# Patient Record
Sex: MALE | Race: WHITE | NOT HISPANIC OR LATINO | Employment: UNEMPLOYED
[De-identification: names, ages, dates, MRNs, and addresses within clinical notes are randomized per-mention and may not be internally consistent; named-entity substitution may affect disease eponyms.]

---

## 2017-06-01 ENCOUNTER — HOSPITAL (OUTPATIENT)
Dept: OTHER | Age: 37
End: 2017-06-01
Attending: EMERGENCY MEDICINE

## 2017-06-01 LAB
DEVICE SN: NORMAL
POC_GLU: 85 MG/DL (ref 70–99)
TECH_ID: NORMAL
VALPRO ACID LVL: 66 UG/ML (ref 50–100)

## 2017-11-22 ENCOUNTER — HOSPITAL (OUTPATIENT)
Dept: OTHER | Age: 37
End: 2017-11-22
Attending: EMERGENCY MEDICINE

## 2017-11-22 LAB
A/G RATIO_: 0.8
ABS LYMPH: 1.8 K/CUMM (ref 1–3.5)
ABS MONO: 0.9 K/CUMM (ref 0.1–0.8)
ABS NEUTRO: 2.6 K/CUMM (ref 2–8)
ALBUMIN: 3.2 G/DL (ref 3.5–5)
ALCOHOL, ETHYL: 24 MG/DL (ref 0–10)
ALK PHOS: 50 UNIT/L (ref 50–124)
ALT/GPT: 16 UNIT/L (ref 0–55)
ANION GAP SERPL CALC-SCNC: 12 MEQ/L (ref 10–20)
AST/GOT: 27 UNIT/L (ref 5–34)
BASOPHIL: 0 % (ref 0–1)
BILI TOTAL: 0.4 MG/DL (ref 0.2–1)
BUN SERPL-MCNC: 20 MG/DL (ref 6–20)
CALCIUM: 8.5 MG/DL (ref 8.4–10.2)
CHLORIDE: 105 MEQ/L (ref 97–107)
CREATININE: 0.95 MG/DL (ref 0.6–1.3)
DEVICE SN: NORMAL
DIFF_TYPE?: ABNORMAL
EOSINOPHIL: 4 % (ref 0–6)
GLOBULIN_: 3.9 G/DL (ref 2–4.1)
GLUCOSE LVL: 87 MG/DL (ref 70–99)
HCT VFR BLD CALC: 44 % (ref 36–51)
HEMOLYSIS 2+: ABNORMAL
HGB BLD-MCNC: 14.8 G/DL (ref 12–17)
IMMATURE GRAN: 0.4 % (ref 0–0.3)
INSTR WBC: 5.5 K/CUMM (ref 4–11)
LIPEMIC 3+: NEGATIVE
LYMPHOCYTE: 32 %
MCH RBC QN AUTO: 33 PG (ref 25–35)
MCHC RBC AUTO-ENTMCNC: 34 G/DL (ref 32–37)
MCV RBC AUTO: 98 FL (ref 78–97)
MONOCYTE: 17 %
NEUTROPHIL: 46 %
NRBC BLD MANUAL-RTO: 0 % (ref 0–0.2)
PLATELET: 203 K/CUMM (ref 150–450)
POC_GLU: 86 MG/DL (ref 70–99)
POTASSIUM: 4.3 MEQ/L (ref 3.5–5.1)
RBC # BLD: 4.46 M/CUMM (ref 4.2–6)
RDW: 14.6 % (ref 11.5–14.5)
SODIUM: 138 MEQ/L (ref 136–145)
TCO2: 25 MEQ/L (ref 19–29)
TECH_ID: NORMAL
TOTAL PROTEIN: 7.1 G/DL (ref 6.4–8.3)
VALPRO ACID LVL: 58.7 UG/ML (ref 50–100)
WBC # BLD: 5.5 K/CUMM (ref 4–11)

## 2017-11-24 ENCOUNTER — APPOINTMENT (OUTPATIENT)
Dept: CT IMAGING | Facility: HOSPITAL | Age: 37
DRG: 897 | End: 2017-11-24
Attending: EMERGENCY MEDICINE
Payer: COMMERCIAL

## 2017-11-24 PROBLEM — S00.83XA CONTUSION OF OTHER PART OF HEAD, INITIAL ENCOUNTER: Status: ACTIVE | Noted: 2017-11-24

## 2017-11-24 PROBLEM — F10.230 ALCOHOL WITHDRAWAL SYNDROME WITHOUT COMPLICATION (HCC): Status: ACTIVE | Noted: 2017-11-24

## 2017-11-24 PROCEDURE — 70450 CT HEAD/BRAIN W/O DYE: CPT | Performed by: EMERGENCY MEDICINE

## 2017-11-24 NOTE — ED PROVIDER NOTES
Patient Seen in: [facility] Emergency Department    History   Patient presents with:  Alcohol Intoxication (neurologic)    Stated Complaint:     HPI    The patient is a 70-year-old male who presents the emergency department requesting detox from a Eyes: Conjunctivae and EOM are normal. Pupils are equal, round, and reactive to light. Neck: Normal range of motion. Neck supple. No spinous process tenderness and no muscular tenderness present. Normal range of motion present.    Cardiovascular: Normal r CBC W/ DIFFERENTIAL[[id number]]          Abnormal            Final result                 Please view results for these tests on the individual orders.    DRUG ABUSE PANEL 10 SCREEN   RAINBOW DRAW BLUE   RAINBOW DRAW LAVENDER   RAINBOW DRAW DARK GREEN   RAIN

## 2017-11-25 NOTE — PLAN OF CARE
Problem: ANXIETY  Goal: Will report anxiety at manageable levels  INTERVENTIONS:  - Administer medication as ordered  - Teach and rehearse alternative coping skills  - Provide emotional support with 1:1 interaction with staff   Outcome: Progressing      Pr for headache. Problem: SAFETY ADULT - FALL  Goal: Free from fall injury  INTERVENTIONS:  - Assess pt frequently for physical needs  - Identify cognitive and physical deficits and behaviors that affect risk of falls.   - [place] fall precautions as ind

## 2017-11-25 NOTE — H&P
[facility]    PATIENT'S NAME: Nancy Dia   ATTENDING PHYSICIAN: Toni Diaz MD   PATIENT ACCOUNT#:   [id number]    LOCATION:  [address] [place] [address] RECORD #:   [id number]       YOB: 1980  ADMISSION DATE:       11/2 95.3 kg. HEENT:  The patient has ecchymosis to the left periorbital area, and this was secondary to a fall. NECK:  No JVD. No bruit. No lymphadenopathy. LUNGS:  Clear to auscultation. HEART:  S1, S2 heard. No S3, no S4.   No murmur, no rub, no gallop precautions. 3.   DVT prophylaxis. The patient is on SC boots. 4.   Inflammatory bowel disease. The patient is on mesalamine and will continue the same. 5.   GI prophylaxis. The patient is on Pepcid orally twice daily.     Dictated By Marcy Maldonado

## 2017-11-25 NOTE — PLAN OF CARE
Problem: Patient/Family Goals  Goal: Patient/Family Long Term Goal  Patient's Long Term Goal: return home    Interventions:  Monitor labs  Vs  Admin meds as ordered    - See additional Care Plan goals for specific interventions   Outcome: Progressing    Go Assess pain using appropriate pain scale  - Administer analgesics based on type and severity of pain and evaluate response  - Implement non-pharmacological measures as appropriate and evaluate response  - Consider cultural and social influences on pain and order or complex needs related to functional status, cognitive ability or social support system   Outcome: Progressing

## 2017-11-26 NOTE — PROGRESS NOTES
[facility]    Progress Note    Amy Mora Patient Status:  Inpatient    3/21/1980 MRN [id number]   Location [facility] 5SW/SE Attending Chato Eng MD   Hosp Day # 1 PCP No primary care provider on file.      Erwin Tay cerebellar atrophy. Extensive encephalomalacic changes in the left frontal-temporal-parietal region presumed chronic sequela from prior traumatic injury/contusion. 2. No intracranial hemorrhage, mass effect or midline shift.  3. Large left temporal-frontal

## 2017-11-26 NOTE — PLAN OF CARE
Problem: Patient/Family Goals  Goal: Patient/Family Long Term Goal  Patient's Long Term Goal: return home    Interventions:  Monitor labs  Vs  Admin meds as ordered    - See additional Care Plan goals for specific interventions    Outcome: Not Progressing pt to monitor pain and request assistance  - Assess pain using appropriate pain scale  - Administer analgesics based on type and severity of pain and evaluate response  - Implement non-pharmacological measures as appropriate and evaluate response  - Consid post-hospital services based on physician/LIP order or complex needs related to functional status, cognitive ability or social support system   Outcome: Progressing  Pt has phone assessment with sariah 12/27 for possible inpatient admittance

## 2017-11-26 NOTE — PLAN OF CARE
Problem: ANXIETY  Goal: Will report anxiety at manageable levels  INTERVENTIONS:  - Administer medication as ordered  - Teach and rehearse alternative coping skills  - Provide emotional support with 1:1 interaction with staff   Outcome: Not Progressing based on assessment  - Modify environment to reduce risk of injury  - Provide assistive devices as appropriate  - Consider OT/PT consult to assist with strengthening/mobility  - Encourage toileting schedule   Outcome: Progressing      Problem: DISCHARGE PL

## 2017-11-26 NOTE — CM/SW NOTE
MD order received regarding ETOH resources. The pt. Lives with his wife and children. BORIS provided resources for ETOH programs and also made a referral to George Myrick to follow up with the pt.  Regarding the Chemical Dependency program to see if he w

## 2017-11-27 PROBLEM — K51.90 CHRONIC ULCERATIVE COLITIS WITHOUT COMPLICATION (HCC): Chronic | Status: ACTIVE | Noted: 2017-11-27

## 2017-11-27 PROBLEM — G40.909 SEIZURE DISORDER (HCC): Chronic | Status: ACTIVE | Noted: 2017-11-27

## 2017-11-27 NOTE — PROGRESS NOTES
[facility]    Progress Note    Eder Salgado Patient Status:  Inpatient    3/21/1980 MRN [id number]   Location [facility] 5SW/SE Attending Shantal Martinez MD   Hosp Day # 2 PCP No primary care provider on file.      Memo Lang 11/24/2017    11/24/2017   CREATSERUM 1.01 11/24/2017   BUN 12 11/24/2017    11/24/2017   K 4.0 11/24/2017    11/24/2017   CO2 27 11/24/2017    (H) 11/24/2017   CA 8.5 11/24/2017   ALB 3.5 11/24/2017   ALKPHO 45 11/24/2017   BILT

## 2017-11-27 NOTE — PLAN OF CARE
ANXIETY    • Will report anxiety at manageable levels Adequate for Discharge        DISCHARGE PLANNING    • Discharge to home or other facility with appropriate resources Adequate for Discharge        DRUG ABUSE/DETOX    • Will have no detox symptoms and w

## 2017-11-27 NOTE — BH PROGRESS NOTE
Pt referred by Christian Contreras for outpatient CD programming. Called pt in his room, discussed tx options w/ Hossein Edouard.  Pt reports he has appt with Florencio tomorrow to enter residential. Provided pt with [facility] CD programming information for follow up care aft

## 2017-11-27 NOTE — PLAN OF CARE
Problem: Patient/Family Goals  Goal: Patient/Family Long Term Goal  Patient's Long Term Goal: return home    Interventions:  Monitor labs  Vs  Admin meds as ordered    - See additional Care Plan goals for specific interventions    Outcome: Not Progressing Implement non-pharmacological measures as appropriate and evaluate response  - Consider cultural and social influences on pain and pain management  - Manage/alleviate anxiety  - Utilize distraction and/or relaxation techniques  - Monitor for opioid side ef

## 2017-11-27 NOTE — PROGRESS NOTES
[facility]    Progress Note    Pauline Calderon Patient Status:  Inpatient    3/21/1980 MRN [id number]   Location [facility] 5SW/SE Attending Alina Jimenez MD   Hosp Day # 3 PCP No primary care provider on file.      Aldo Buchanan 11/24/2017   AST 37 11/24/2017   ALT 16 (L) 11/24/2017   ETOH 213 (H) 11/24/2017                         Hao Roca MD  11/27/2017

## 2017-11-28 NOTE — PAYOR COMM NOTE
--------------  DISCHARGE REVIEW    Payor: [employer]  Subscriber #:  [id number]  Authorization Number: [id number]    Admit date: 11/24/17  Admit time:  1500  Discharge Date: 11/27/2017  7:46 PM     Admitting Physician: Chato Eng MD  Attending Past Medical History:   Diagnosis Date   • Head injury        History reviewed. No pertinent surgical history.         Smoking status: Never Smoker                                                              Smokeless tobacco: Current User Psychiatric: He has a normal mood and affect. Judgment normal.   Nursing note and vitals reviewed.     Differential diagnosis includes alcohol withdrawal, head injury        ED Course[NORI.1]     Labs Reviewed   BASIC METABOLIC PANEL (8) - Abnormal; Notable f CONCLUSION:  1. Diffuse cerebral and cerebellar atrophy. Extensive encephalomalacic changes in the left frontal-temporal-parietal region presumed chronic sequela from prior traumatic injury/contusion.  2. No intracranial hemorrhage, mass effect or midline s MEDICAL RECORD #:   [id number]       YOB: 1980  ADMISSION DATE:       11/24/2017    HISTORY AND PHYSICAL EXAMINATION    CHIEF COMPLAINT:  Alcohol intoxication and alcohol withdrawal.    HISTORY OF PRESENT ILLNESS:  This patient is a 37-yea LUNGS:  Clear to auscultation. HEART:  S1, S2 heard. No S3, no S4. No murmur, no rub, no gallop. ABDOMEN:  Soft, nontender, nondistended. No hepatosplenomegaly. EXTREMITIES:  No edema. NEUROLOGIC:  Alert and oriented x3. No deficit.   No asterixis 5.   GI prophylaxis. The patient is on Pepcid orally twice daily. Dictated By Kita Riley MD  d: 11/24/2017 23:57:29  t: 11/25/2017 00:03:21  [facility] [id number]  MV/  [MV.1]   Attribution Key     MV. 1 - Gasper Boss MD on 11/

## 2018-04-13 ENCOUNTER — HOSPITAL (OUTPATIENT)
Dept: OTHER | Age: 38
End: 2018-04-13
Attending: INTERNAL MEDICINE

## 2018-04-13 LAB
ANALYZER ANC (IANC): NORMAL
ANION GAP SERPL CALC-SCNC: 16 MMOL/L (ref 10–20)
BASOPHILS # BLD: 0 THOUSAND/MCL (ref 0–0.3)
BASOPHILS NFR BLD: 0 %
BUN SERPL-MCNC: 19 MG/DL (ref 6–20)
BUN/CREAT SERPL: 23 (ref 7–25)
CALCIUM SERPL-MCNC: 8.3 MG/DL (ref 8.4–10.2)
CHLORIDE: 107 MMOL/L (ref 98–107)
CO2 SERPL-SCNC: 29 MMOL/L (ref 21–32)
CREAT SERPL-MCNC: 0.82 MG/DL (ref 0.67–1.17)
DIFFERENTIAL METHOD BLD: NORMAL
EOSINOPHIL # BLD: 0.1 THOUSAND/MCL (ref 0.1–0.5)
EOSINOPHIL NFR BLD: 1 %
ERYTHROCYTE [DISTWIDTH] IN BLOOD: 12.2 % (ref 11–15)
GLUCOSE BLDC GLUCOMTR-MCNC: 91 MG/DL (ref 65–99)
GLUCOSE SERPL-MCNC: 110 MG/DL (ref 65–99)
HEMATOCRIT: 42.4 % (ref 39–51)
HGB BLD-MCNC: 15.1 GM/DL (ref 13–17)
LEVETIRACETAM SERPL-MCNC: 6.2 MCG/ML (ref 6–46)
LYMPHOCYTES # BLD: 4.3 THOUSAND/MCL (ref 1–4.8)
LYMPHOCYTES NFR BLD: 48 %
MCH RBC QN AUTO: 33 PG (ref 26–34)
MCHC RBC AUTO-ENTMCNC: 35.6 GM/DL (ref 32–36.5)
MCV RBC AUTO: 92.6 FL (ref 78–100)
MONOCYTES # BLD: 0.6 THOUSAND/MCL (ref 0.3–0.9)
MONOCYTES NFR BLD: 7 %
NEUTROPHILS # BLD: 3.3 THOUSAND/MCL (ref 1.8–7.7)
NEUTS SEG NFR BLD: 40 %
PATH REV BLD -IMP: NORMAL
PERCENT NRBC: NORMAL
PLAT MORPH BLD: NORMAL
PLATELET # BLD: 272 THOUSAND/MCL (ref 140–450)
POLYCHROMASIA (POLY): NORMAL
POTASSIUM SERPL-SCNC: 4.6 MMOL/L (ref 3.4–5.1)
RBC # BLD: 4.58 MILLION/MCL (ref 4.5–5.9)
SODIUM SERPL-SCNC: 147 MMOL/L (ref 135–145)
VALPROATE SERPL-MCNC: 25 MCG/ML (ref 50–125)
VARIANT LYMPHS NFR BLD: 4 % (ref 0–5)
WBC # BLD: 8.3 THOUSAND/MCL (ref 4.2–11)
WBC MORPH BLD: NORMAL

## 2018-04-14 LAB
ANALYZER ANC (IANC): ABNORMAL
ANION GAP SERPL CALC-SCNC: 12 MMOL/L (ref 10–20)
BASOPHILS # BLD: 0 THOUSAND/MCL (ref 0–0.3)
BASOPHILS NFR BLD: 0 %
BUN SERPL-MCNC: 18 MG/DL (ref 6–20)
BUN/CREAT SERPL: 20 (ref 7–25)
CALCIUM SERPL-MCNC: 8 MG/DL (ref 8.4–10.2)
CHLORIDE: 105 MMOL/L (ref 98–107)
CO2 SERPL-SCNC: 28 MMOL/L (ref 21–32)
CREAT SERPL-MCNC: 0.9 MG/DL (ref 0.67–1.17)
DIFFERENTIAL METHOD BLD: ABNORMAL
EOSINOPHIL # BLD: 0.3 THOUSAND/MCL (ref 0.1–0.5)
EOSINOPHIL NFR BLD: 4 %
ERYTHROCYTE [DISTWIDTH] IN BLOOD: 12.1 % (ref 11–15)
GLUCOSE BLDC GLUCOMTR-MCNC: 87 MG/DL (ref 65–99)
GLUCOSE BLDC GLUCOMTR-MCNC: 90 MG/DL (ref 65–99)
GLUCOSE SERPL-MCNC: 91 MG/DL (ref 65–99)
HEMATOCRIT: 39 % (ref 39–51)
HGB BLD-MCNC: 13.9 GM/DL (ref 13–17)
LYMPHOCYTES # BLD: 2.9 THOUSAND/MCL (ref 1–4.8)
LYMPHOCYTES NFR BLD: 32 %
MAGNESIUM SERPL-MCNC: 1.3 MG/DL (ref 1.7–2.4)
MCH RBC QN AUTO: 32.5 PG (ref 26–34)
MCHC RBC AUTO-ENTMCNC: 35.6 GM/DL (ref 32–36.5)
MCV RBC AUTO: 91.1 FL (ref 78–100)
MONOCYTES # BLD: 1.3 THOUSAND/MCL (ref 0.3–0.9)
MONOCYTES NFR BLD: 14 %
NEUTROPHILS # BLD: 4.5 THOUSAND/MCL (ref 1.8–7.7)
NEUTROPHILS NFR BLD: 50 %
NEUTS SEG NFR BLD: ABNORMAL %
PERCENT NRBC: ABNORMAL
PHOSPHATE SERPL-MCNC: 3.9 MG/DL (ref 2.4–4.7)
PLATELET # BLD: 216 THOUSAND/MCL (ref 140–450)
POTASSIUM SERPL-SCNC: 4.1 MMOL/L (ref 3.4–5.1)
RBC # BLD: 4.28 MILLION/MCL (ref 4.5–5.9)
SODIUM SERPL-SCNC: 141 MMOL/L (ref 135–145)
WBC # BLD: 9 THOUSAND/MCL (ref 4.2–11)

## 2018-04-15 LAB
ANION GAP SERPL CALC-SCNC: 12 MMOL/L (ref 10–20)
BUN SERPL-MCNC: 17 MG/DL (ref 6–20)
BUN/CREAT SERPL: 22 (ref 7–25)
CALCIUM SERPL-MCNC: 8.6 MG/DL (ref 8.4–10.2)
CHLORIDE: 105 MMOL/L (ref 98–107)
CO2 SERPL-SCNC: 25 MMOL/L (ref 21–32)
CREAT SERPL-MCNC: 0.79 MG/DL (ref 0.67–1.17)
GLUCOSE SERPL-MCNC: 99 MG/DL (ref 65–99)
MAGNESIUM SERPL-MCNC: 1.8 MG/DL (ref 1.7–2.4)
POTASSIUM SERPL-SCNC: 5.1 MMOL/L (ref 3.4–5.1)
SODIUM SERPL-SCNC: 137 MMOL/L (ref 135–145)

## 2018-05-03 NOTE — DISCHARGE SUMMARY
[facility]    PATIENT'S NAME: Anny Yuan   ATTENDING PHYSICIAN: Kita Riley MD   PATIENT ACCOUNT#:   [id number]    LOCATION:  [address] [place] [address] RECORD #:   [id number]       YOB: 1980  ADMISSION DATE:       11/2

## 2020-02-22 ENCOUNTER — TELEPHONE (OUTPATIENT)
Dept: SCHEDULING | Age: 40
End: 2020-02-22

## 2020-02-27 ENCOUNTER — TELEPHONE (OUTPATIENT)
Dept: SCHEDULING | Age: 40
End: 2020-02-27

## 2020-02-27 ENCOUNTER — APPOINTMENT (OUTPATIENT)
Dept: FAMILY MEDICINE | Age: 40
End: 2020-02-27

## 2020-02-27 ENCOUNTER — OFFICE VISIT (OUTPATIENT)
Dept: INTERNAL MEDICINE | Age: 40
End: 2020-02-27

## 2020-02-27 VITALS
DIASTOLIC BLOOD PRESSURE: 52 MMHG | BODY MASS INDEX: 29.99 KG/M2 | HEIGHT: 72 IN | SYSTOLIC BLOOD PRESSURE: 109 MMHG | TEMPERATURE: 97.6 F | WEIGHT: 221.45 LBS | HEART RATE: 53 BPM | OXYGEN SATURATION: 96 %

## 2020-02-27 DIAGNOSIS — K51.30 CHRONIC ULCERATIVE RECTOSIGMOIDITIS WITHOUT COMPLICATIONS (CMD): ICD-10-CM

## 2020-02-27 DIAGNOSIS — G40.909 SEIZURE DISORDER (CMD): Primary | ICD-10-CM

## 2020-02-27 PROCEDURE — 99203 OFFICE O/P NEW LOW 30 MIN: CPT | Performed by: INTERNAL MEDICINE

## 2020-02-27 RX ORDER — DIVALPROEX SODIUM 250 MG/1
250 TABLET, EXTENDED RELEASE ORAL DAILY
Qty: 90 TABLET | Refills: 3 | Status: SHIPPED | OUTPATIENT
Start: 2020-02-27

## 2020-02-27 RX ORDER — NICOTINE 21 MG/24HR
1 PATCH, TRANSDERMAL 24 HOURS TRANSDERMAL
COMMUNITY
Start: 2017-11-28

## 2020-02-27 RX ORDER — MESALAMINE 1.2 G/1
1200 TABLET, DELAYED RELEASE ORAL 4 TIMES DAILY
Qty: 360 TABLET | Refills: 3 | Status: SHIPPED | OUTPATIENT
Start: 2020-02-27 | End: 2020-03-19

## 2020-02-27 RX ORDER — LEVETIRACETAM 1000 MG/1
1500 TABLET ORAL 2 TIMES DAILY
Qty: 180 TABLET | Refills: 3 | Status: SHIPPED | OUTPATIENT
Start: 2020-02-27

## 2020-02-27 RX ORDER — DIVALPROEX SODIUM 500 MG/1
1500 TABLET, DELAYED RELEASE ORAL
COMMUNITY
Start: 2015-06-18

## 2020-02-27 RX ORDER — MESALAMINE 1.2 G/1
1.2 TABLET, DELAYED RELEASE ORAL
COMMUNITY
Start: 2017-11-27 | End: 2020-03-19

## 2020-02-27 RX ORDER — VALACYCLOVIR HYDROCHLORIDE 1 G/1
500 TABLET, FILM COATED ORAL DAILY
Qty: 90 TABLET | Refills: 3 | Status: SHIPPED | OUTPATIENT
Start: 2020-02-27

## 2020-02-27 RX ORDER — DIVALPROEX SODIUM 250 MG/1
TABLET, EXTENDED RELEASE ORAL
COMMUNITY
Start: 2016-02-21 | End: 2020-02-27 | Stop reason: SDUPTHER

## 2020-02-27 RX ORDER — ACAMPROSATE CALCIUM 333 MG/1
TABLET, DELAYED RELEASE ORAL
COMMUNITY

## 2020-02-27 RX ORDER — MESALAMINE 1.2 G/1
TABLET, DELAYED RELEASE ORAL
COMMUNITY
End: 2020-02-27 | Stop reason: SDUPTHER

## 2020-02-27 RX ORDER — LEVETIRACETAM 1000 MG/1
1500 TABLET ORAL
COMMUNITY
Start: 2017-11-27 | End: 2020-02-27 | Stop reason: SDUPTHER

## 2020-02-27 SDOH — HEALTH STABILITY: MENTAL HEALTH: HOW OFTEN DO YOU HAVE A DRINK CONTAINING ALCOHOL?: NEVER

## 2020-03-03 ENCOUNTER — TELEPHONE (OUTPATIENT)
Dept: SCHEDULING | Age: 40
End: 2020-03-03

## 2020-03-24 ENCOUNTER — TELEPHONE (OUTPATIENT)
Dept: INTERNAL MEDICINE | Age: 40
End: 2020-03-24

## 2020-07-27 ENCOUNTER — HOSPITAL (OUTPATIENT)
Dept: OTHER | Age: 40
End: 2020-07-27
Attending: FAMILY MEDICINE

## 2020-08-13 ENCOUNTER — HOSPITAL (OUTPATIENT)
Dept: OTHER | Age: 40
End: 2020-08-13
Attending: FAMILY MEDICINE